# Patient Record
Sex: FEMALE | Race: WHITE | HISPANIC OR LATINO | URBAN - METROPOLITAN AREA
[De-identification: names, ages, dates, MRNs, and addresses within clinical notes are randomized per-mention and may not be internally consistent; named-entity substitution may affect disease eponyms.]

---
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
## 2020-03-06 NOTE — DISCHARGE NOTE OB - CARE PROVIDER_API CALL
Umu Henderson), Obstetrics and Gynecology  [address] [place] [address]  Phone: [phone]  Fax: [phone]    Cynthia Lyn), Pediatrics  [address] [place] [address]  Phone: [phone]  Fax: [phone] No

## 2020-03-30 NOTE — OB RN TRIAGE NOTE - NS_FETALHEARTRATE_OBGYN_ALL_OB_FT
All patients on ADHD medications are to be asked these screening questions based on  the recommendations by the American Heart Association.    Doctor:  Dr. Earl  Med name:  Focalin  Dose:15 MG  Is the med working well?  yes  Headaches?  no  Muscle twitching?  no  Stomach ache?  no  Is the patient eating and sleeping well?  yes  Any fainting spells? no  Sensations of chest discomfort, palpitation, or racing heart? no  Has any family member been diagnosed with an enlarged heart or rhythm problem? no  Is there any family HX of early sudden death prior to age 40? Yes, aunt Ceiclia  Date script is needed:  asap  What telephone number can you be reached at today? [phone]         152

## 2020-08-08 ENCOUNTER — OUTPATIENT (OUTPATIENT)
Dept: INPATIENT UNIT | Facility: HOSPITAL | Age: 20
LOS: 1 days | Discharge: HOME | End: 2020-08-08
Payer: COMMERCIAL

## 2020-08-08 VITALS
RESPIRATION RATE: 16 BRPM | DIASTOLIC BLOOD PRESSURE: 68 MMHG | TEMPERATURE: 98 F | HEART RATE: 79 BPM | SYSTOLIC BLOOD PRESSURE: 105 MMHG

## 2020-08-08 VITALS — DIASTOLIC BLOOD PRESSURE: 63 MMHG | HEART RATE: 67 BPM | SYSTOLIC BLOOD PRESSURE: 107 MMHG

## 2020-08-08 DIAGNOSIS — Z3A.00 WEEKS OF GESTATION OF PREGNANCY NOT SPECIFIED: ICD-10-CM

## 2020-08-08 DIAGNOSIS — O26.899 OTHER SPECIFIED PREGNANCY RELATED CONDITIONS, UNSPECIFIED TRIMESTER: ICD-10-CM

## 2020-08-08 PROCEDURE — 59025 FETAL NON-STRESS TEST: CPT | Mod: 26

## 2020-08-08 PROCEDURE — 76818 FETAL BIOPHYS PROFILE W/NST: CPT | Mod: 26

## 2020-08-08 NOTE — OB PROVIDER TRIAGE NOTE - HISTORY OF PRESENT ILLNESS
Patient is a 18y/o  @ 40 1/7wks gestation who reports to triage with c/o contractions q 5-7min.  Denies lof or vaginal bleeding.  Reports good fetal movements.    AP Course:  iron deficiency anemia; s/p iv iron  Meds - pnv  NKDA

## 2020-08-08 NOTE — OB RN TRIAGE NOTE - PMH
Asthma  last attack greater than 4 years ago- no meds  Iron deficiency anemia, unspecified iron deficiency anemia type  iron transfusion x 5 during pregnancy   (normal spontaneous vaginal delivery)  2018 FT, M 5#15, GHTN  Vaginal delivery  2019  uncomplicated gbs positive. 7-15

## 2020-08-08 NOTE — OB PROVIDER TRIAGE NOTE - NSHPPHYSICALEXAM_GEN_ALL_CORE
Vital Signs Last 24 Hrs  T(C): 36.9 (08 Aug 2020 12:09), Max: 36.9 (08 Aug 2020 12:09)  T(F): 98.4 (08 Aug 2020 12:09), Max: 98.4 (08 Aug 2020 12:09)  HR: 67 (08 Aug 2020 12:43) (67 - 79)  BP: 107/63 (08 Aug 2020 12:43) (105/68 - 107/63)  BP(mean): --  RR: 16 (08 Aug 2020 12:09) (16 - 16)      Abdomen gravid, soft and nontender  NST -  SVE - 2/60/-3 Intact; soft  TAS -  BPP -  GBS negative Vital Signs Last 24 Hrs  T(C): 36.9 (08 Aug 2020 12:09), Max: 36.9 (08 Aug 2020 12:09)  T(F): 98.4 (08 Aug 2020 12:09), Max: 98.4 (08 Aug 2020 12:09)  HR: 67 (08 Aug 2020 12:43) (67 - 79)  BP: 107/63 (08 Aug 2020 12:43) (105/68 - 107/63)  BP(mean): --  RR: 16 (08 Aug 2020 12:09) (16 - 16)      Abdomen gravid, soft and nontender  NST - CAT 1 FHT  SVE - 2/60/-3 Intact; soft  TAS - vtx/ant plac/jeremy 12.85  BPP - 8/8  GBS negative

## 2020-08-08 NOTE — OB PROVIDER TRIAGE NOTE - NSOBPROVIDERNOTE_OBGYN_ALL_OB_FT
Patient is a 18y/o  @ 40 1/7wks gestation who reports to triage with c/o contractions q 5-7min.  Denies lof or vaginal bleeding.  Reports good fetal movements.    AP Course:  iron deficiency anemia; s/p iv iron  Meds - pnv  NKDA    PSH/GYN/SH - denies  PMH - iron def anemia;  iv iron transfusion x 5 - last one on 8/3/20.           - asthma; last used pump 4yrs ago  OB  - - 2018 - 5lbs 15oz  - - 2019 - 7lbs 15oz    Abdomen gravid, soft and nontender  NST -  SVE - /-3 Intact; soft  TAS -  BPP -  GBS negative Patient is a 20y/o  @ 40 1/7wks gestation who reports to triage with c/o contractions q 5-7min.  Denies lof or vaginal bleeding.  Reports good fetal movements.    AP Course:  iron deficiency anemia; s/p iv iron  Meds - pnv  NKDA    PSH/GYN/SH - denies  PMH - iron def anemia;  iv iron transfusion x 5 - last one on 8/3/20.           - asthma; last used pump 4yrs ago  OB  - - 2018 - 5lbs 15oz; comp by ghtn  - - 2019 - 7lbs 15oz    Vital Signs Last 24 Hrs  T(C): 36.9 (08 Aug 2020 12:09), Max: 36.9 (08 Aug 2020 12:09)  T(F): 98.4 (08 Aug 2020 12:09), Max: 98.4 (08 Aug 2020 12:09)  HR: 67 (08 Aug 2020 12:43) (67 - 79)  BP: 107/63 (08 Aug 2020 12:43) (105/68 - 107/63)  BP(mean): --  RR: 16 (08 Aug 2020 12:09) (16 - 16)    Abdomen gravid, soft and nontender  NST - CAT 1 FHT  SVE - 2/60/-3 Intact; soft  TAS - vtx/ant plac/jeremy 12.85  BPP - 8/8  GBS negative    Discussed patient with Dr Trinidad  Plan:  patient is cleared for discharge home with labor precautions.  ROM also discussed.

## 2020-08-09 PROBLEM — D50.9 IRON DEFICIENCY ANEMIA, UNSPECIFIED: Chronic | Status: ACTIVE | Noted: 2020-08-08

## 2020-08-11 ENCOUNTER — INPATIENT (INPATIENT)
Facility: HOSPITAL | Age: 20
LOS: 0 days | Discharge: HOME | End: 2020-08-12
Attending: OBSTETRICS & GYNECOLOGY | Admitting: OBSTETRICS & GYNECOLOGY

## 2020-08-11 ENCOUNTER — TRANSCRIPTION ENCOUNTER (OUTPATIENT)
Age: 20
End: 2020-08-11

## 2020-08-11 ENCOUNTER — APPOINTMENT (OUTPATIENT)
Dept: DISASTER EMERGENCY | Facility: CLINIC | Age: 20
End: 2020-08-11

## 2020-08-11 VITALS
HEART RATE: 93 BPM | SYSTOLIC BLOOD PRESSURE: 114 MMHG | RESPIRATION RATE: 17 BRPM | TEMPERATURE: 98 F | DIASTOLIC BLOOD PRESSURE: 69 MMHG

## 2020-08-11 DIAGNOSIS — O26.899 OTHER SPECIFIED PREGNANCY RELATED CONDITIONS, UNSPECIFIED TRIMESTER: ICD-10-CM

## 2020-08-11 DIAGNOSIS — Z3A.00 WEEKS OF GESTATION OF PREGNANCY NOT SPECIFIED: ICD-10-CM

## 2020-08-11 LAB
BASOPHILS # BLD AUTO: 0.04 K/UL — SIGNIFICANT CHANGE UP (ref 0–0.2)
BASOPHILS NFR BLD AUTO: 0.3 % — SIGNIFICANT CHANGE UP (ref 0–2)
BLD GP AB SCN SERPL QL: NEGATIVE — SIGNIFICANT CHANGE UP
EOSINOPHIL # BLD AUTO: 0.15 K/UL — SIGNIFICANT CHANGE UP (ref 0–0.5)
EOSINOPHIL NFR BLD AUTO: 1.3 % — SIGNIFICANT CHANGE UP (ref 0–6)
HBV SURFACE AG SER-ACNC: NEGATIVE — SIGNIFICANT CHANGE UP
HCT VFR BLD CALC: 42.1 % — SIGNIFICANT CHANGE UP (ref 34.5–45)
HGB BLD-MCNC: 13.4 G/DL — SIGNIFICANT CHANGE UP (ref 11.5–15.5)
HIV COMBO RESULT: SIGNIFICANT CHANGE UP
HIV1+2 AB SPEC QL: SIGNIFICANT CHANGE UP
IMM GRANULOCYTES NFR BLD AUTO: 0.5 % — SIGNIFICANT CHANGE UP (ref 0–1.5)
LYMPHOCYTES # BLD AUTO: 18.8 % — SIGNIFICANT CHANGE UP (ref 13–44)
LYMPHOCYTES # BLD AUTO: 2.25 K/UL — SIGNIFICANT CHANGE UP (ref 1–3.3)
MCHC RBC-ENTMCNC: 26.1 PG — LOW (ref 27–34)
MCHC RBC-ENTMCNC: 31.8 % — LOW (ref 32–36)
MCV RBC AUTO: 82.1 FL — SIGNIFICANT CHANGE UP (ref 80–100)
MONOCYTES # BLD AUTO: 0.97 K/UL — HIGH (ref 0–0.9)
MONOCYTES NFR BLD AUTO: 8.1 % — SIGNIFICANT CHANGE UP (ref 2–14)
NEUTROPHILS # BLD AUTO: 8.53 K/UL — HIGH (ref 1.8–7.4)
NEUTROPHILS NFR BLD AUTO: 71 % — SIGNIFICANT CHANGE UP (ref 43–77)
NRBC # FLD: 0 K/UL — SIGNIFICANT CHANGE UP (ref 0–0)
PLATELET # BLD AUTO: 218 K/UL — SIGNIFICANT CHANGE UP (ref 150–400)
PMV BLD: SIGNIFICANT CHANGE UP FL (ref 7–13)
RBC # BLD: 5.13 M/UL — SIGNIFICANT CHANGE UP (ref 3.8–5.2)
RBC # FLD: 24.1 % — HIGH (ref 10.3–14.5)
RH IG SCN BLD-IMP: POSITIVE — SIGNIFICANT CHANGE UP
SARS-COV-2 RNA SPEC QL NAA+PROBE: SIGNIFICANT CHANGE UP
WBC # BLD: 12 K/UL — HIGH (ref 3.8–10.5)
WBC # FLD AUTO: 12 K/UL — HIGH (ref 3.8–10.5)

## 2020-08-11 RX ORDER — SODIUM CHLORIDE 9 MG/ML
1000 INJECTION, SOLUTION INTRAVENOUS
Refills: 0 | Status: DISCONTINUED | OUTPATIENT
Start: 2020-08-11 | End: 2020-08-11

## 2020-08-11 RX ORDER — OXYTOCIN 10 UNIT/ML
333.33 VIAL (ML) INJECTION
Qty: 20 | Refills: 0 | Status: DISCONTINUED | OUTPATIENT
Start: 2020-08-11 | End: 2020-08-12

## 2020-08-11 RX ORDER — DIPHENHYDRAMINE HCL 50 MG
25 CAPSULE ORAL EVERY 6 HOURS
Refills: 0 | Status: DISCONTINUED | OUTPATIENT
Start: 2020-08-11 | End: 2020-08-12

## 2020-08-11 RX ORDER — TETANUS TOXOID, REDUCED DIPHTHERIA TOXOID AND ACELLULAR PERTUSSIS VACCINE, ADSORBED 5; 2.5; 8; 8; 2.5 [IU]/.5ML; [IU]/.5ML; UG/.5ML; UG/.5ML; UG/.5ML
0.5 SUSPENSION INTRAMUSCULAR ONCE
Refills: 0 | Status: DISCONTINUED | OUTPATIENT
Start: 2020-08-11 | End: 2020-08-12

## 2020-08-11 RX ORDER — MAGNESIUM HYDROXIDE 400 MG/1
30 TABLET, CHEWABLE ORAL
Refills: 0 | Status: DISCONTINUED | OUTPATIENT
Start: 2020-08-11 | End: 2020-08-12

## 2020-08-11 RX ORDER — AER TRAVELER 0.5 G/1
1 SOLUTION RECTAL; TOPICAL EVERY 4 HOURS
Refills: 0 | Status: DISCONTINUED | OUTPATIENT
Start: 2020-08-11 | End: 2020-08-12

## 2020-08-11 RX ORDER — SODIUM CHLORIDE 9 MG/ML
3 INJECTION INTRAMUSCULAR; INTRAVENOUS; SUBCUTANEOUS EVERY 8 HOURS
Refills: 0 | Status: DISCONTINUED | OUTPATIENT
Start: 2020-08-11 | End: 2020-08-12

## 2020-08-11 RX ORDER — ACETAMINOPHEN 500 MG
3 TABLET ORAL
Qty: 0 | Refills: 0 | DISCHARGE
Start: 2020-08-11

## 2020-08-11 RX ORDER — ACETAMINOPHEN 500 MG
975 TABLET ORAL
Refills: 0 | Status: DISCONTINUED | OUTPATIENT
Start: 2020-08-11 | End: 2020-08-12

## 2020-08-11 RX ORDER — PRAMOXINE HYDROCHLORIDE 150 MG/15G
1 AEROSOL, FOAM RECTAL EVERY 4 HOURS
Refills: 0 | Status: DISCONTINUED | OUTPATIENT
Start: 2020-08-11 | End: 2020-08-12

## 2020-08-11 RX ORDER — LANOLIN
1 OINTMENT (GRAM) TOPICAL EVERY 6 HOURS
Refills: 0 | Status: DISCONTINUED | OUTPATIENT
Start: 2020-08-11 | End: 2020-08-12

## 2020-08-11 RX ORDER — SIMETHICONE 80 MG/1
80 TABLET, CHEWABLE ORAL EVERY 4 HOURS
Refills: 0 | Status: DISCONTINUED | OUTPATIENT
Start: 2020-08-11 | End: 2020-08-12

## 2020-08-11 RX ORDER — OXYCODONE HYDROCHLORIDE 5 MG/1
5 TABLET ORAL ONCE
Refills: 0 | Status: DISCONTINUED | OUTPATIENT
Start: 2020-08-11 | End: 2020-08-12

## 2020-08-11 RX ORDER — IBUPROFEN 200 MG
1 TABLET ORAL
Qty: 0 | Refills: 0 | DISCHARGE
Start: 2020-08-11

## 2020-08-11 RX ORDER — HYDROCORTISONE 1 %
1 OINTMENT (GRAM) TOPICAL EVERY 6 HOURS
Refills: 0 | Status: DISCONTINUED | OUTPATIENT
Start: 2020-08-11 | End: 2020-08-12

## 2020-08-11 RX ORDER — OXYCODONE HYDROCHLORIDE 5 MG/1
5 TABLET ORAL
Refills: 0 | Status: DISCONTINUED | OUTPATIENT
Start: 2020-08-11 | End: 2020-08-12

## 2020-08-11 RX ORDER — DIBUCAINE 1 %
1 OINTMENT (GRAM) RECTAL EVERY 6 HOURS
Refills: 0 | Status: DISCONTINUED | OUTPATIENT
Start: 2020-08-11 | End: 2020-08-12

## 2020-08-11 RX ORDER — BENZOCAINE 10 %
1 GEL (GRAM) MUCOUS MEMBRANE EVERY 6 HOURS
Refills: 0 | Status: DISCONTINUED | OUTPATIENT
Start: 2020-08-11 | End: 2020-08-12

## 2020-08-11 RX ORDER — IBUPROFEN 200 MG
600 TABLET ORAL EVERY 6 HOURS
Refills: 0 | Status: COMPLETED | OUTPATIENT
Start: 2020-08-11 | End: 2021-07-10

## 2020-08-11 RX ORDER — KETOROLAC TROMETHAMINE 30 MG/ML
30 SYRINGE (ML) INJECTION ONCE
Refills: 0 | Status: DISCONTINUED | OUTPATIENT
Start: 2020-08-11 | End: 2020-08-12

## 2020-08-11 RX ADMIN — Medication 0.2 MILLIGRAM(S): at 21:31

## 2020-08-11 RX ADMIN — Medication 0.25 MILLIGRAM(S): at 18:14

## 2020-08-11 RX ADMIN — Medication 1000 MILLIUNIT(S)/MIN: at 20:07

## 2020-08-11 RX ADMIN — SODIUM CHLORIDE 125 MILLILITER(S): 9 INJECTION, SOLUTION INTRAVENOUS at 15:23

## 2020-08-11 NOTE — OB PROVIDER H&P - HISTORY OF PRESENT ILLNESS
18yo female  @ 40.4 wks SLIUP here complaining of ctx's 10/10 intensity every 2-3 min. Pt is intact with GFM. Pt reports that baby has swelling in both kidneys and needs follow after delivery. PNC complicated by anemia and has had iron infusions-last one was 8/3.    Pmhx-asthma-last attack > 4 yrs; no hosp/intubations  Pshx/Hosp-denies  Meds-PNV; iron infusions; albuterol PRN  NKDA  Past ob- FT x 2  Gyn-denies  Soc-denies

## 2020-08-11 NOTE — OB RN TRIAGE NOTE - NS_OTHER_OBGYN_ALL_OB_FT
"my OB said the baby's kidneys are swollen and need to be checked after delivery"; no MFM evaluation

## 2020-08-11 NOTE — OB NEONATOLOGY/PEDIATRICIAN DELIVERY SUMMARY - NSPEDSNEONOTESA_OBGYN_ALL_OB_FT
Baby is a 40w4d M born via  to a 18yo -->3 O+ mother. Maternal history significant for asthma. PNL pending at time of delivery, GBS unknown, untreated. Prenatal US concerning for possible hydronephrosis. AROM at time of delivery with light meconium. Baby emerged crying and vigorous, was w/d/s/s, APGARS 9/9. Mom would like to breastfeed, consents to hepB, and consents to circumcision. EOS 0.04. Baby is a 40w4d M born via  to a 20yo -->3 O+ mother. Maternal history significant for asthma. PNL pending at time of delivery, GBS unknown, untreated. Baby with concern for possible b/l hydronephrosis, per OB. AROM at time of delivery with light meconium. Baby emerged crying and vigorous, was w/d/s/s, APGARS 9/9. Mom would like to breastfeed, consents to hepB, and consents to circumcision. EOS 0.04.

## 2020-08-11 NOTE — PROVIDER CONTACT NOTE (OTHER) - SITUATION
Pt s/p vaginal delivery, , no lacs. Fundus firm & at umbilicus. Vitals stable. Patients pad saturated within 1 hr post delivery. 2nd pad with mod bleeding & small clots upon palpation of fundus

## 2020-08-11 NOTE — PROVIDER CONTACT NOTE (OTHER) - ACTION/TREATMENT ORDERED:
MD to bedside, assessed bleeding, fundus & previous pads that were saved by RN. Pt's vitals remain stable, bleeding seemed to have slowed down. No interventions at this time, will cont to monitor.

## 2020-08-11 NOTE — CHART NOTE - NSCHARTNOTEFT_GEN_A_CORE
R3 chart note:    Patient requesting epidural. SVE 3/80/-3. Intact. Exam unchanged. FHT CAT I. Patient cal regularly.     Approved for epi  Charge said patient can go to clean room   Communicated w/ RN in triage     D/w Dr. Vivi Tabor PGY-3

## 2020-08-11 NOTE — OB PROVIDER DELIVERY SUMMARY - NSPROVIDERDELIVERYNOTE_OBGYN_ALL_OB_FT
Pt became fd and pushed to deliver a liveborn male apgars 9/9 [id number] over intact perineum  placenta delivered spontaneously with 3vc  ebl 300ml

## 2020-08-11 NOTE — OB PROVIDER TRIAGE NOTE - NSOBPROVIDERNOTE_OBGYN_ALL_OB_FT
18yo female  @ 40.4 wks SLIUP with anemia requiring iron infusions here in labor  -fetus with b/l hydronephrosis needing follow up PP  -GBS neg  -pt was admitted for labor and was dc Dr Trinidad. Pt declining epidural

## 2020-08-11 NOTE — DISCHARGE NOTE OB - HOSPITAL COURSE
labor at term     liveborn male [id number] labor at term     liveborn male [id number]  meeting all milestones.  stable for d/c

## 2020-08-11 NOTE — OB RN DELIVERY SUMMARY - NS_SEPSISRSKCALC_OBGYN_ALL_OB_FT
EOS calculated successfully. EOS Risk Factor: 0.5/1000 live births ([facility] national incidence); GA=40w4d; Temp=98.24; ROM=0.1; GBS='Unknown'; Antibiotics='No antibiotics or any antibiotics < 2 hrs prior to birth'

## 2020-08-11 NOTE — OB RN TRIAGE NOTE - CURRENT PREGNANCY COMPLICATIONS, OB PROFILE
anemic iron transfusions (5) Other/anemic iron transfusions (5) anemic iron transfusions (5) last one 8/4/Other

## 2020-08-11 NOTE — DISCHARGE NOTE OB - CARE PROVIDER_API CALL
Vaishali Avery  OBSTETRICS AND GYNECOLOGY  [address]  [place] [address]  Phone: [phone]  Fax: [phone]  Established Patient  Follow Up Time: Routine

## 2020-08-11 NOTE — OB PROVIDER TRIAGE NOTE - NSHPPHYSICALEXAM_GEN_ALL_CORE
Gen: A&O x 3; Appears uncomf with ctx's  Vitals: BP-114/69; P-93; T-36.77    Pulm-CTA B/L; no wheezes  Cor-clear S1S2; no murmurs  Abd exam-soft and nontender    VE-3.5/80/-3  TAS to confirm vtx    GBS neg    NST cat I with 150 baseline with +accels and mod variability; ctx's Q2-3 min

## 2020-08-11 NOTE — DISCHARGE NOTE OB - PATIENT PORTAL LINK FT
You can access the FollowMyHealth Patient Portal offered by [facility] by registering at the following website: [email or web address]. By joining [employer]’s FollowMyHealth portal, you will also be able to view your health information using other applications (apps) compatible with our system.

## 2020-08-11 NOTE — OB PROVIDER TRIAGE NOTE - HISTORY OF PRESENT ILLNESS
20yo female  @ 40.4 wks SLIUP here complaining of ctx's 10/10 intensity every 2-3 min. Pt is intact with GFM. Pt reports that baby has swelling in both kidneys and needs follow after delivery. PNC complicated by anemia and has had iron infusions-last one was 8/3.    Pmhx-asthma-last attack > 4 yrs; no hosp/intubations  Pshx/Hosp-denies  Meds-PNV; iron infusions; albuterol PRN  NKDA  Past ob- FT x 2  Gyn-denies  Soc-denies

## 2020-08-11 NOTE — PROVIDER CONTACT NOTE (OTHER) - ASSESSMENT
Patients pad saturated within 1 hr post delivery. 2nd pad with mod bleeding & small clots upon palpation of fundus. Vitals stable. Patient denies lightheadeness, dizzines, nausea or pain.

## 2020-08-12 VITALS
RESPIRATION RATE: 16 BRPM | OXYGEN SATURATION: 98 % | TEMPERATURE: 98 F | DIASTOLIC BLOOD PRESSURE: 62 MMHG | SYSTOLIC BLOOD PRESSURE: 101 MMHG | HEART RATE: 77 BPM

## 2020-08-12 LAB
RUBV IGG SER-ACNC: 2.4 INDEX — SIGNIFICANT CHANGE UP
RUBV IGG SER-IMP: POSITIVE — SIGNIFICANT CHANGE UP
T PALLIDUM AB TITR SER: NEGATIVE — SIGNIFICANT CHANGE UP

## 2020-08-12 RX ORDER — IBUPROFEN 200 MG
600 TABLET ORAL EVERY 6 HOURS
Refills: 0 | Status: DISCONTINUED | OUTPATIENT
Start: 2020-08-12 | End: 2020-08-12

## 2020-08-12 RX ADMIN — SODIUM CHLORIDE 3 MILLILITER(S): 9 INJECTION INTRAMUSCULAR; INTRAVENOUS; SUBCUTANEOUS at 05:42

## 2020-08-12 RX ADMIN — Medication 975 MILLIGRAM(S): at 01:25

## 2020-08-12 RX ADMIN — Medication 0.2 MILLIGRAM(S): at 01:25

## 2020-08-12 RX ADMIN — Medication 975 MILLIGRAM(S): at 02:20

## 2020-08-12 RX ADMIN — Medication 600 MILLIGRAM(S): at 20:51

## 2020-08-12 RX ADMIN — Medication 975 MILLIGRAM(S): at 17:19

## 2020-08-12 RX ADMIN — Medication 975 MILLIGRAM(S): at 09:42

## 2020-08-12 RX ADMIN — Medication 975 MILLIGRAM(S): at 09:01

## 2020-08-12 RX ADMIN — Medication 0.2 MILLIGRAM(S): at 09:22

## 2020-08-12 RX ADMIN — Medication 0.2 MILLIGRAM(S): at 05:40

## 2020-08-12 RX ADMIN — Medication 0.2 MILLIGRAM(S): at 17:19

## 2020-08-12 RX ADMIN — Medication 1 TABLET(S): at 13:42

## 2020-08-12 RX ADMIN — Medication 600 MILLIGRAM(S): at 21:21

## 2020-08-12 RX ADMIN — Medication 0.2 MILLIGRAM(S): at 13:41

## 2020-08-12 NOTE — PROGRESS NOTE ADULT - SUBJECTIVE AND OBJECTIVE BOX
OB Attending Progress Note:  PPD#1    S: 18yo  PPD#1 s/p . Patient feels well. Pain is well controlled. She is tolerating a regular diet and passing flatus. She is voiding spontaneously, and ambulating without difficulty. Denies CP/SOB. Denies lightheadedness/dizziness. Denies N/V.    O:  Vitals:  Vital Signs Last 24 Hrs  T(C): 36.6 (12 Aug 2020 06:10), Max: 37.1 (11 Aug 2020 22:00)  T(F): 97.9 (12 Aug 2020 06:10), Max: 98.7 (11 Aug 2020 22:00)  HR: 107 (12 Aug 2020 06:10) (67 - 155)  BP: 98/53 (12 Aug 2020 06:10) (98/53 - 128/84)  BP(mean): --  RR: 18 (12 Aug 2020 06:10) (16 - 18)  SpO2: 100% (12 Aug 2020 06:10) (98% - 100%)    Labs:  Blood type: O Positive  Rubella IgG: RPR: Negative                          13.4   12.00<H> >-----------< 218    ( 08-11 @ 13:17 )             42.1        Physical Exam:  General: NAD  Abdomen: soft, non-tender, non-distended, fundus firm  Vaginal: Lochia wnl  Extremities: No erythema/edema, no calf tenderness b/l     A/P: 18yo PPD#1 s/p .  Patient is stable and doing well post-partum.   - Pain well controlled, continue current pain regimen  - Increase ambulation, SCDs when not ambulating  - Continue regular diet  -Discharge planning  - Discharge instructions and return precautions reviewed    Sangeeta Ramirez

## 2021-02-22 NOTE — DISCHARGE NOTE OB - NS OB DC PLAN IMMU TDAP DATE
January 2020 Rhofade Pregnancy And Lactation Text: This medication has not been assigned a Pregnancy Risk Category. It is unknown if the medication is excreted in breast milk.

## 2021-04-13 NOTE — OB PROVIDER H&P - NS_STATION_OBGYN_ALL_OB_NU
Patient was educated on Right ankle sprain  Patient was educated on icing and taking OTC Tylenol and Ibuprofen for the pain  Patient was educated on right ankle brace  Patient was told if symptoms persist to follow up with Ortho    Ankle Sprain   WHAT YOU NEED TO KNOW:   An ankle sprain happens when 1 or more ligaments in your ankle joint stretch or tear  Ligaments are tough tissues that connect bones  Ligaments support your joints and keep your bones in place  DISCHARGE INSTRUCTIONS:   Return to the emergency department if:   · You have severe pain in your ankle  · Your foot or toes are cold or numb  · Your ankle becomes more weak or unstable (wobbly)  · You are unable to put any weight on your ankle or foot  · Your swelling has increased or returned  Call your doctor if:   · Your pain does not go away, even after treatment  · You have questions or concerns about your condition or care  Medicines: You may need any of the following:  · NSAIDs , such as ibuprofen, help decrease swelling, pain, and fever  This medicine is available with or without a doctor's order  NSAIDs can cause stomach bleeding or kidney problems in certain people  If you take blood thinner medicine, always ask your healthcare provider if NSAIDs are safe for you  Always read the medicine label and follow directions  · Acetaminophen  decreases pain and fever  It is available without a doctor's order  Ask how much to take and how often to take it  Follow directions  Read the labels of all other medicines you are using to see if they also contain acetaminophen, or ask your doctor or pharmacist  Acetaminophen can cause liver damage if not taken correctly  Do not use more than 4 grams (4,000 milligrams) total of acetaminophen in one day  · Prescription pain medicine  may be given  Ask your healthcare provider how to take this medicine safely  Some prescription pain medicines contain acetaminophen   Do not take other medicines that contain acetaminophen without talking to your healthcare provider  Too much acetaminophen may cause liver damage  Prescription pain medicine may cause constipation  Ask your healthcare provider how to prevent or treat constipation  · Take your medicine as directed  Contact your healthcare provider if you think your medicine is not helping or if you have side effects  Tell him or her if you are allergic to any medicine  Keep a list of the medicines, vitamins, and herbs you take  Include the amounts, and when and why you take them  Bring the list or the pill bottles to follow-up visits  Carry your medicine list with you in case of an emergency  Self-care:   · Use support devices,  such as a brace, cast, or splint, to limit your movement and protect your joint  You may need to use crutches to decrease your pain as you move around  · Go to physical therapy as directed  A physical therapist teaches you exercises to help improve movement and strength, and to decrease pain  · Rest  your ankle so that it can heal  Return to normal activities as directed  · Apply ice  on your ankle for 15 to 20 minutes every hour or as directed  Use an ice pack, or put crushed ice in a plastic bag  Cover it with a towel  Ice helps prevent tissue damage and decreases swelling and pain  · Compress  your ankle  Ask if you should wrap an elastic bandage around your injured ligament  An elastic bandage provides support and helps decrease swelling and movement so your joint can heal  Wear as long as directed  · Elevate  your ankle above the level of your heart as often as you can  This will help decrease swelling and pain  Prop your ankle on pillows or blankets to keep it elevated comfortably  Prevent another ankle sprain:   · Let your ankle heal   Find out how long your ligament needs to heal  Do not do any physical activity until your healthcare provider says it is okay   If you start activity too soon, you may develop a more serious injury  · Always warm up and stretch  before you exercise or play sports  · Use the right equipment  Always wear shoes that fit well and are made for the activity that you are doing  You may also need ankle supports, elbow and knee pads, or braces  Follow up with your doctor as directed:  Write down your questions so you remember to ask them during your visits  © Copyright [address] Information is for End User's use only and may not be sold, redistributed or otherwise used for commercial purposes  All illustrations and images included in CareNotes® are the copyrighted property of A D A M , Inc  or [address] [place] [address]  The above information is an  only  It is not intended as medical advice for individual conditions or treatments  Talk to your doctor, nurse or pharmacist before following any medical regimen to see if it is safe and effective for you  -3

## 2022-06-07 NOTE — OB RN TRIAGE NOTE - GRAVIDA, OB PROFILE
I spoke with Patient's daughter Katey Dumont. I told her per Dr Kajal Woodall  mom needs to isolate from her daughter. She needs to wear a mask  at all times and separate out by more than 6 foot. It is better to have mom stay in her own room and have her own bathroom and room. Patient's daughter stated her mom has her own bathroom. She will like to know if she can give any medication to her mom. She has sinus drainage. 2

## 2022-12-14 ENCOUNTER — LABORATORY RESULT (OUTPATIENT)
Age: 22
End: 2022-12-14

## 2022-12-14 ENCOUNTER — APPOINTMENT (OUTPATIENT)
Dept: OBGYN | Facility: CLINIC | Age: 22
End: 2022-12-14
Payer: MEDICARE

## 2022-12-14 VITALS
SYSTOLIC BLOOD PRESSURE: 126 MMHG | WEIGHT: 179 LBS | HEIGHT: 60 IN | DIASTOLIC BLOOD PRESSURE: 80 MMHG | BODY MASS INDEX: 35.14 KG/M2

## 2022-12-14 DIAGNOSIS — N91.1 SECONDARY AMENORRHEA: ICD-10-CM

## 2022-12-14 DIAGNOSIS — Z01.419 ENCOUNTER FOR GYNECOLOGICAL EXAMINATION (GENERAL) (ROUTINE) W/OUT ABNORMAL FINDINGS: ICD-10-CM

## 2022-12-14 DIAGNOSIS — J45.909 UNSPECIFIED ASTHMA, UNCOMPLICATED: ICD-10-CM

## 2022-12-14 PROCEDURE — 99212 OFFICE O/P EST SF 10 MIN: CPT | Mod: 25

## 2022-12-14 PROCEDURE — 99385 PREV VISIT NEW AGE 18-39: CPT

## 2022-12-14 NOTE — HISTORY OF PRESENT ILLNESS
[FreeTextEntry1] : pt comes for an annual exam and that she has not had a period for two years and was concerned . She said her other gynecologist said it was normal . She has the implanon that was placed two years ago . She was not explained that the implanon can cause amenorrhea . she has no spotting , weight gain , headaches or depression . She is sexually active with the same partner .

## 2023-01-06 NOTE — OB PROVIDER TRIAGE NOTE - NS_OBACUITYLEVEL_OBGYN_ALL_OB
Ms. Dawson has been called and advised that the referral ha been placed & they will call to schedule the appointment  
Patient called stating she would like to be referred to Neurologist  Dr. Julian in [place].  
Please let patient know that referral has been placed.  Larissa Herrera  
She said the tremors in her left hand are getting worse.     She states she has not been able to see the other Neurologist she was referred to.   She said her appointments had to be rescheduled due to weather or the doctor being out of the office and when she is reschedule it is another 6 months out.     
2

## 2023-09-19 NOTE — OB RN PATIENT PROFILE - NO SIGNIFICANT PAST SURGICAL HISTORY
Refill request for VENLAFAXINE HCL ER 75 MG CAP medication.      Name of Pharmacy- [facility]      Last visit - 8-     Pending visit - 9-    Last refill - 6-      Medication Contract signed -   Last Bill Rule ran-         Additional Comments
<<----- Click to add NO significant Past Surgical History

## 2025-05-04 NOTE — PROGRESS NOTE ADULT - PROBLEM/PLAN-2
DISPLAY PLAN FREE TEXT FAMILY HISTORY:  Family history of essential hypertension  Family history of sickle cell trait